# Patient Record
Sex: MALE | Race: WHITE | NOT HISPANIC OR LATINO | URBAN - NONMETROPOLITAN AREA
[De-identification: names, ages, dates, MRNs, and addresses within clinical notes are randomized per-mention and may not be internally consistent; named-entity substitution may affect disease eponyms.]

---

## 2022-03-14 ENCOUNTER — ON CAMPUS - OUTPATIENT (OUTPATIENT)
Dept: URBAN - NONMETROPOLITAN AREA HOSPITAL 28 | Facility: HOSPITAL | Age: 52
End: 2022-03-14
Payer: OTHER GOVERNMENT

## 2022-03-14 DIAGNOSIS — K64.8 OTHER HEMORRHOIDS: ICD-10-CM

## 2022-03-14 DIAGNOSIS — Z12.11 ENCOUNTER FOR SCREENING FOR MALIGNANT NEOPLASM OF COLON: ICD-10-CM

## 2022-03-14 DIAGNOSIS — D12.5 BENIGN NEOPLASM OF SIGMOID COLON: ICD-10-CM

## 2022-03-14 PROCEDURE — 45380 COLONOSCOPY AND BIOPSY: CPT | Mod: 33 | Performed by: INTERNAL MEDICINE

## 2022-08-15 ENCOUNTER — OFFICE (OUTPATIENT)
Dept: URBAN - NONMETROPOLITAN AREA CLINIC 5 | Facility: CLINIC | Age: 52
End: 2022-08-15
Payer: OTHER GOVERNMENT

## 2022-08-15 VITALS
HEIGHT: 72 IN | DIASTOLIC BLOOD PRESSURE: 73 MMHG | WEIGHT: 237 LBS | HEART RATE: 56 BPM | SYSTOLIC BLOOD PRESSURE: 111 MMHG | RESPIRATION RATE: 56 BRPM | TEMPERATURE: 20 F

## 2022-08-15 DIAGNOSIS — R19.7 DIARRHEA, UNSPECIFIED: ICD-10-CM

## 2022-08-15 DIAGNOSIS — R10.84 GENERALIZED ABDOMINAL PAIN: ICD-10-CM

## 2022-08-15 DIAGNOSIS — R50.9 FEVER, UNSPECIFIED: ICD-10-CM

## 2022-08-15 PROCEDURE — 99214 OFFICE O/P EST MOD 30 MIN: CPT | Performed by: INTERNAL MEDICINE

## 2022-08-15 NOTE — SERVICENOTES
Given patient's postprandial abdominal pain, fevers, nausea, vomiting, and diarrhea will plan for HIDA scan to rule out biliary component contributing.  Given recent CT scan showed a short segment of proximal jejunum with thickened wall and adjacent mesenteric stranding suspicious for an inflamed diverticula, contained perforation, or small-bowel tumor will plan for CT enterography for further assessment.  If it is not clear would consider video capsule endoscopy.  Given patient's symptoms will also plan for EGD with small bowel gastric biopsies to rule out other etiology contributing.  Will plan to see back in clinic in 6 weeks.  Counseled soluble fiber use as outlined above.

## 2022-08-15 NOTE — SERVICEHPINOTES
Pankaj Thomas   is a   51   year old  male    with a past medical history of GERD and hypertension who presents  for fever, diarrhea, severe abdominal pain.  In review patient was referred for an average risk screening colonoscopy which was completed on 03/14/2022. This noted 2 polyps each measuring 2 mm in the sigmoid colon, small internal hemorrhoids on retroflexed views in the rectum, but otherwise normal-appearing mucosa throughout the entire colon. Polyps returned as tubular adenoma and patient was counseled the need for 7 year recall. He presents for follow-up today.     Patient today reports on Memorial Day had a horse fall on him and he landed on his left side.  Three weeks later he woke up and had significant nausea and vomiting.  He developed a fever and significant dizziness which prompted him to present to the ER.  He reports that time he had a CT scan, chest x-ray, and blood work and was admitted to the hospital.  He was ultimately told he had "heat exhaustion".  Patient reports that his symptoms returned several weeks later with very similar episode.  This prompted him to return to the ER on 08/01.  At that time he had blood work  which was largely unremarkable aside from slightly elevated AST, platelet count of 146,. Patient also had a CT abdomen at that time which noted "there is a short segment of proximal jejunum seen to the left of the midline in the upper abdomen which appears to have a thickened wall with adjacent mesenteric stranding.  There is an apparent outpouching from the posterior lateral wall of this inflamed segment.  This may represent an inflamed diverticula or contained perforation. An underlying small bowel tumor cannot be excluded.  No other acute finding seen within the abdomen or pelvis. "  Patient returned home and was doing well for couple days before his symptoms returned.  This prompted him to present to the ER on 08/07.  At that time he had blood work revealing  WBC 12.2, hemoglobin 17.2,  platelets 141,  sodium 134, potassium 3.7, chloride 98, CO2 32, BUN 11, creatinine 1, T bili 0.8,  AST 54, ALT 40, alk-phos 71, total protein 7.6, albumin of 3.3.  He also had a procalcitonin which was slightly elevated.  He also had blood cultures which were unremarkable well as a negative respiratory viral panel.  He presents follow-up today.  He reports that his bowels are now semi solid.  He does constinue with gassiness.  His pain has significantly improved. He denies bright blood per rectum, melena, nausea, vomiting, dysphagia, odynophagia, or weight loss.  He denies smoking but drinks approximately a 6 packs every 2 days.  He has never had an EGD.  He denies undergoing HIDA scan.  He has never attempted fiber for regularity his bowels.

## 2022-08-22 ENCOUNTER — AMBULATORY SURGICAL CENTER (OUTPATIENT)
Dept: URBAN - NONMETROPOLITAN AREA SURGERY 4 | Facility: SURGERY | Age: 52
End: 2022-08-22
Payer: OTHER GOVERNMENT

## 2022-08-22 ENCOUNTER — OFFICE (OUTPATIENT)
Dept: URBAN - METROPOLITAN AREA PATHOLOGY 29 | Facility: PATHOLOGY | Age: 52
End: 2022-08-22
Payer: OTHER GOVERNMENT

## 2022-08-22 VITALS
SYSTOLIC BLOOD PRESSURE: 152 MMHG | RESPIRATION RATE: 16 BRPM | DIASTOLIC BLOOD PRESSURE: 73 MMHG | DIASTOLIC BLOOD PRESSURE: 83 MMHG | RESPIRATION RATE: 20 BRPM | HEART RATE: 61 BPM | HEART RATE: 88 BPM | OXYGEN SATURATION: 97 % | DIASTOLIC BLOOD PRESSURE: 84 MMHG | HEART RATE: 80 BPM | OXYGEN SATURATION: 92 % | SYSTOLIC BLOOD PRESSURE: 145 MMHG | HEART RATE: 71 BPM | RESPIRATION RATE: 13 BRPM | DIASTOLIC BLOOD PRESSURE: 85 MMHG | HEIGHT: 72 IN | WEIGHT: 235 LBS | RESPIRATION RATE: 20 BRPM | SYSTOLIC BLOOD PRESSURE: 152 MMHG | OXYGEN SATURATION: 98 % | SYSTOLIC BLOOD PRESSURE: 152 MMHG | DIASTOLIC BLOOD PRESSURE: 91 MMHG | RESPIRATION RATE: 16 BRPM | OXYGEN SATURATION: 94 % | SYSTOLIC BLOOD PRESSURE: 155 MMHG | HEART RATE: 64 BPM | RESPIRATION RATE: 13 BRPM | HEART RATE: 71 BPM | HEIGHT: 72 IN | OXYGEN SATURATION: 99 % | DIASTOLIC BLOOD PRESSURE: 95 MMHG | OXYGEN SATURATION: 93 % | HEART RATE: 63 BPM | DIASTOLIC BLOOD PRESSURE: 80 MMHG | DIASTOLIC BLOOD PRESSURE: 103 MMHG | SYSTOLIC BLOOD PRESSURE: 169 MMHG | OXYGEN SATURATION: 94 % | DIASTOLIC BLOOD PRESSURE: 73 MMHG | DIASTOLIC BLOOD PRESSURE: 85 MMHG | DIASTOLIC BLOOD PRESSURE: 91 MMHG | HEART RATE: 61 BPM | OXYGEN SATURATION: 93 % | DIASTOLIC BLOOD PRESSURE: 95 MMHG | OXYGEN SATURATION: 97 % | HEART RATE: 63 BPM | OXYGEN SATURATION: 92 % | SYSTOLIC BLOOD PRESSURE: 169 MMHG | SYSTOLIC BLOOD PRESSURE: 143 MMHG | OXYGEN SATURATION: 98 % | RESPIRATION RATE: 20 BRPM | DIASTOLIC BLOOD PRESSURE: 80 MMHG | RESPIRATION RATE: 17 BRPM | DIASTOLIC BLOOD PRESSURE: 85 MMHG | HEART RATE: 88 BPM | SYSTOLIC BLOOD PRESSURE: 160 MMHG | OXYGEN SATURATION: 95 % | OXYGEN SATURATION: 99 % | TEMPERATURE: 97.9 F | OXYGEN SATURATION: 99 % | OXYGEN SATURATION: 95 % | SYSTOLIC BLOOD PRESSURE: 169 MMHG | SYSTOLIC BLOOD PRESSURE: 147 MMHG | RESPIRATION RATE: 16 BRPM | WEIGHT: 235 LBS | DIASTOLIC BLOOD PRESSURE: 91 MMHG | RESPIRATION RATE: 7 BRPM | RESPIRATION RATE: 13 BRPM | HEART RATE: 64 BPM | TEMPERATURE: 98.3 F | HEART RATE: 80 BPM | HEART RATE: 63 BPM | OXYGEN SATURATION: 94 % | HEIGHT: 72 IN | DIASTOLIC BLOOD PRESSURE: 103 MMHG | DIASTOLIC BLOOD PRESSURE: 103 MMHG | RESPIRATION RATE: 17 BRPM | OXYGEN SATURATION: 98 % | SYSTOLIC BLOOD PRESSURE: 145 MMHG | TEMPERATURE: 98 F | DIASTOLIC BLOOD PRESSURE: 80 MMHG | OXYGEN SATURATION: 93 % | HEART RATE: 68 BPM | TEMPERATURE: 98.3 F | DIASTOLIC BLOOD PRESSURE: 83 MMHG | DIASTOLIC BLOOD PRESSURE: 84 MMHG | HEART RATE: 80 BPM | HEART RATE: 68 BPM | HEART RATE: 71 BPM | SYSTOLIC BLOOD PRESSURE: 147 MMHG | RESPIRATION RATE: 7 BRPM | HEART RATE: 68 BPM | SYSTOLIC BLOOD PRESSURE: 143 MMHG | DIASTOLIC BLOOD PRESSURE: 83 MMHG | SYSTOLIC BLOOD PRESSURE: 161 MMHG | TEMPERATURE: 98 F | SYSTOLIC BLOOD PRESSURE: 161 MMHG | HEART RATE: 61 BPM | DIASTOLIC BLOOD PRESSURE: 95 MMHG | SYSTOLIC BLOOD PRESSURE: 155 MMHG | SYSTOLIC BLOOD PRESSURE: 143 MMHG | SYSTOLIC BLOOD PRESSURE: 147 MMHG | TEMPERATURE: 97.9 F | TEMPERATURE: 98.3 F | WEIGHT: 235 LBS | TEMPERATURE: 98 F | RESPIRATION RATE: 17 BRPM | SYSTOLIC BLOOD PRESSURE: 160 MMHG | TEMPERATURE: 97.9 F | HEART RATE: 64 BPM | SYSTOLIC BLOOD PRESSURE: 155 MMHG | SYSTOLIC BLOOD PRESSURE: 160 MMHG | HEART RATE: 88 BPM | DIASTOLIC BLOOD PRESSURE: 73 MMHG | SYSTOLIC BLOOD PRESSURE: 145 MMHG | OXYGEN SATURATION: 97 % | DIASTOLIC BLOOD PRESSURE: 84 MMHG | SYSTOLIC BLOOD PRESSURE: 161 MMHG | OXYGEN SATURATION: 92 % | RESPIRATION RATE: 7 BRPM | OXYGEN SATURATION: 95 %

## 2022-08-22 DIAGNOSIS — K57.10 DIVERTICULOSIS OF SMALL INTESTINE WITHOUT PERFORATION OR ABS: ICD-10-CM

## 2022-08-22 DIAGNOSIS — K29.50 UNSPECIFIED CHRONIC GASTRITIS WITHOUT BLEEDING: ICD-10-CM

## 2022-08-22 DIAGNOSIS — R10.84 GENERALIZED ABDOMINAL PAIN: ICD-10-CM

## 2022-08-22 DIAGNOSIS — R19.7 DIARRHEA, UNSPECIFIED: ICD-10-CM

## 2022-08-22 PROCEDURE — 88342 IMHCHEM/IMCYTCHM 1ST ANTB: CPT

## 2022-08-22 PROCEDURE — 88305 TISSUE EXAM BY PATHOLOGIST: CPT

## 2022-08-22 PROCEDURE — 43239 EGD BIOPSY SINGLE/MULTIPLE: CPT | Performed by: INTERNAL MEDICINE

## 2022-08-22 PROCEDURE — 00731 ANES UPR GI NDSC PX NOS: CPT | Mod: QZ | Performed by: NURSE ANESTHETIST, CERTIFIED REGISTERED

## 2022-08-22 PROCEDURE — 88313 SPECIAL STAINS GROUP 2: CPT

## 2023-04-12 ENCOUNTER — OFFICE (OUTPATIENT)
Dept: URBAN - NONMETROPOLITAN AREA CLINIC 5 | Facility: CLINIC | Age: 53
End: 2023-04-12
Payer: OTHER GOVERNMENT

## 2023-04-12 VITALS
WEIGHT: 261 LBS | DIASTOLIC BLOOD PRESSURE: 82 MMHG | HEART RATE: 67 BPM | RESPIRATION RATE: 20 BRPM | HEIGHT: 72 IN | SYSTOLIC BLOOD PRESSURE: 122 MMHG

## 2023-04-12 DIAGNOSIS — K59.00 CONSTIPATION, UNSPECIFIED: ICD-10-CM

## 2023-04-12 DIAGNOSIS — R74.8 ABNORMAL LEVELS OF OTHER SERUM ENZYMES: ICD-10-CM

## 2023-04-12 DIAGNOSIS — R10.84 GENERALIZED ABDOMINAL PAIN: ICD-10-CM

## 2023-04-12 PROCEDURE — 99214 OFFICE O/P EST MOD 30 MIN: CPT | Performed by: INTERNAL MEDICINE

## 2023-04-12 NOTE — SERVICENOTES
Given patient's episodes of severe left upper quadrant abdominal pain will plan to obtain HIDA scan rule to out biliary causes.  Given elevated liver enzymes will plan to obtain a serologic workup to rule out other causes for his elevated liver enzymes.  At follow-up would plan for a fibroscan given patient's thrombocytopenia.  For patient's hard stools I recommended being on MiraLax and a stool softener.  He can up titrate the MiraLax as needed.  Given patient's CT findings and questionable scoliosis will refer to neurosurgery to assess the need for any intervention.  Counseled patient on possible radicular type pain contributing to his abdominal discomfort.  Also counseled on the need for weight loss as he has gained 25 lbs since last evaluation.  If at follow-up with persistent symptoms and no orthopedic reason to explain his symptoms will plan to start on a TCA with amitriptyline after obtaining a VCE given his initial CT findings.

## 2023-04-12 NOTE — SERVICEHPINOTES
Pankaj Thomas   is a   52   year old  male  with a past medical history of GERD and hypertension who presents for follow up of severe abdominal pain.  In review patient was referred for an average risk screening colonoscopy which was completed on 03/14/2022. This noted 2 polyps each measuring 2 mm in the sigmoid colon, small internal hemorrhoids on retroflexed views in the rectum, but otherwise normal-appearing mucosa throughout the entire colon. Polyps returned as tubular adenomas and patient was counseled the need for 7 year recall. At prior follow-up in 8/2022 he reported on Memorial Day he had a horse fall on him and he landed on his left side.  Three weeks later he woke up and had significant nausea and vomiting.  He developed a fever and significant dizziness which prompted him to present to the ER.  He reported at that time he had a CT scan, chest x-ray, and blood work and was admitted to the hospital.  He was ultimately told he had "heat exhaustion".  Patient reports that his symptoms returned several weeks later with a similar episode.  This prompted him to return to the ER on 08/01/2022.  At that time he had blood work  which was largely unremarkable aside from slightly elevated AST and a platelet count of 146,. Patient also had a CT abdomen at that time which noted "there is a short segment of proximal jejunum seen to the left of the midline in the upper abdomen which appears to have a thickened wall with adjacent mesenteric stranding.  There is an apparent outpouching from the posterior lateral wall of this inflamed segment.  This may represent an inflamed diverticula or contained perforation. An underlying small bowel tumor cannot be excluded.  No other acute finding seen within the abdomen or pelvis. "  Patient returned home and was doing well for couple days before his symptoms returned.  This prompted him to present to the ER on 08/07.  At that time he had blood work revealing  WBC 12.2, hemoglobin 17.2,  platelets 141,  sodium 134, potassium 3.7, chloride 98, CO2 32, BUN 11, creatinine 1, T bili 0.8,  AST 54, ALT 40, alk-phos 71, total protein 7.6, albumin of 3.3.  He also had a procalcitonin which was slightly elevated.  He also had blood cultures which were unremarkable well as a negative respiratory viral panel.  At prior visit his pain had significantly improved. He denies bright blood per rectum, melena, nausea, vomiting, dysphagia, odynophagia, or weight loss.  He denied smoking but reported drinking a 6 packs every 2 days.  He had never had an EGD.  He denied undergoing HIDA scan.  He had never attempted fiber for regularity his bowels. 
br
[facility] last visit patient had a CT enterography that showed “probable mild fatty infiltration of liver. No suspicious gastrointestinal abnormalities evident. There is significant osseous neural foraminal encroachment at multiple levels of the lower lumbar spine which potentially could result in radicular symptomatology. ” Patient had a subsequent EGD on 08/22/2022 which noted diverticulum in the 2nd portion the duodenum, normal mucosa in the duodenum, normal mucosa in the fundus/body/antrum, and normal mucosa in the esophagus. Patient presents for follow-up today. He reports an episode of left upper quadrant pain that occurred 2 weeks ago and felt like a “hot knife” and prompted him to present to the ER for further assessment. Patient while in the ER on 04/01/2023 had a repeat CT abdomen pelvis which noted “no acute abnormality in the abdomen or pelvis.  Also on imaging was sacralization of L5 on the left. There was multilevel degenerative changes of the visualized spine and scoliotic curve of the lumbar spine with convex to the left. No suspicious osseous lesions were appreciated. Blood work at that time revealed WBC 9.4, hemoglobin 16, platelets 145, normal lipase, sodium 135, potassium 3.3, chloride 104, CO2 24, BUN 16, creatinine 0.86, T bili 0.6, AST 38, ALT 64, alk-phos 63, total protein 6.9, albumin 3.5.   Patient reports today that he is better now than when he went to the ER.  The episode started with a dull pain/soreness in his left upper quadrant which progressed and prompted him to present to the ER visit as above.  He was given IV morphine and discharged with [place].  He did report 2 days ago he began to feel tender again in his LUQ but things have not progressed.  He does continue to be regular with his bowel movements going twice daily.  He reports his stools are often hard when he first starts to go.  He has not attempted a stool softener or MiraLax.  Counseled patient on imaging findings as above in his lower spine.  He does endorse significant back pain.  He has gained 25 lbs since last visit.  He does continue to drink alcohol multiple nights weekly.  Patient has never seen an orthopedic surgeon.

## 2023-07-20 ENCOUNTER — OFFICE (OUTPATIENT)
Dept: URBAN - NONMETROPOLITAN AREA CLINIC 5 | Facility: CLINIC | Age: 53
End: 2023-07-20
Payer: OTHER GOVERNMENT

## 2023-07-20 VITALS — HEIGHT: 72 IN

## 2023-07-20 DIAGNOSIS — K76.0 FATTY (CHANGE OF) LIVER, NOT ELSEWHERE CLASSIFIED: ICD-10-CM

## 2023-07-20 PROCEDURE — 76981 USE PARENCHYMA: CPT
